# Patient Record
Sex: FEMALE | URBAN - METROPOLITAN AREA
[De-identification: names, ages, dates, MRNs, and addresses within clinical notes are randomized per-mention and may not be internally consistent; named-entity substitution may affect disease eponyms.]

---

## 2023-04-04 ENCOUNTER — LAB REQUISITION (OUTPATIENT)
Dept: LAB | Facility: CLINIC | Age: 73
End: 2023-04-04
Payer: COMMERCIAL

## 2023-04-04 DIAGNOSIS — I10 ESSENTIAL (PRIMARY) HYPERTENSION: ICD-10-CM

## 2023-04-05 LAB
ANION GAP SERPL CALCULATED.3IONS-SCNC: 12 MMOL/L (ref 7–15)
BUN SERPL-MCNC: 30.5 MG/DL (ref 8–23)
CALCIUM SERPL-MCNC: 9.3 MG/DL (ref 8.8–10.2)
CHLORIDE SERPL-SCNC: 107 MMOL/L (ref 98–107)
CREAT SERPL-MCNC: 0.59 MG/DL (ref 0.51–0.95)
DEPRECATED HCO3 PLAS-SCNC: 23 MMOL/L (ref 22–29)
GFR SERPL CREATININE-BSD FRML MDRD: >90 ML/MIN/1.73M2
GLUCOSE SERPL-MCNC: 75 MG/DL (ref 70–99)
POTASSIUM SERPL-SCNC: 4.3 MMOL/L (ref 3.4–5.3)
SODIUM SERPL-SCNC: 142 MMOL/L (ref 136–145)

## 2023-04-05 PROCEDURE — 80048 BASIC METABOLIC PNL TOTAL CA: CPT | Mod: ORL | Performed by: NURSE PRACTITIONER

## 2023-04-05 PROCEDURE — P9604 ONE-WAY ALLOW PRORATED TRIP: HCPCS | Mod: ORL | Performed by: NURSE PRACTITIONER

## 2023-04-05 PROCEDURE — 36415 COLL VENOUS BLD VENIPUNCTURE: CPT | Mod: ORL | Performed by: NURSE PRACTITIONER

## 2023-05-02 ENCOUNTER — LAB REQUISITION (OUTPATIENT)
Dept: LAB | Facility: CLINIC | Age: 73
End: 2023-05-02
Payer: COMMERCIAL

## 2023-05-02 DIAGNOSIS — I10 ESSENTIAL (PRIMARY) HYPERTENSION: ICD-10-CM

## 2023-05-10 LAB
ANION GAP SERPL CALCULATED.3IONS-SCNC: 12 MMOL/L (ref 7–15)
BUN SERPL-MCNC: 28.7 MG/DL (ref 8–23)
CALCIUM SERPL-MCNC: 9.6 MG/DL (ref 8.8–10.2)
CHLORIDE SERPL-SCNC: 102 MMOL/L (ref 98–107)
CREAT SERPL-MCNC: 0.68 MG/DL (ref 0.51–0.95)
DEPRECATED HCO3 PLAS-SCNC: 30 MMOL/L (ref 22–29)
GFR SERPL CREATININE-BSD FRML MDRD: >90 ML/MIN/1.73M2
GLUCOSE SERPL-MCNC: 83 MG/DL (ref 70–99)
POTASSIUM SERPL-SCNC: 3.1 MMOL/L (ref 3.4–5.3)
SODIUM SERPL-SCNC: 144 MMOL/L (ref 136–145)

## 2023-05-10 PROCEDURE — 36415 COLL VENOUS BLD VENIPUNCTURE: CPT | Mod: ORL | Performed by: NURSE PRACTITIONER

## 2023-05-10 PROCEDURE — P9604 ONE-WAY ALLOW PRORATED TRIP: HCPCS | Mod: ORL | Performed by: NURSE PRACTITIONER

## 2023-05-10 PROCEDURE — 80048 BASIC METABOLIC PNL TOTAL CA: CPT | Mod: ORL | Performed by: NURSE PRACTITIONER

## 2023-05-26 ENCOUNTER — MEDICAL CORRESPONDENCE (OUTPATIENT)
Dept: HEALTH INFORMATION MANAGEMENT | Facility: CLINIC | Age: 73
End: 2023-05-26
Payer: COMMERCIAL

## 2023-06-13 ENCOUNTER — LAB REQUISITION (OUTPATIENT)
Dept: LAB | Facility: CLINIC | Age: 73
End: 2023-06-13
Payer: COMMERCIAL

## 2023-06-13 DIAGNOSIS — I10 ESSENTIAL (PRIMARY) HYPERTENSION: ICD-10-CM

## 2023-06-14 ENCOUNTER — TRANSCRIBE ORDERS (OUTPATIENT)
Dept: OTHER | Age: 73
End: 2023-06-14

## 2023-06-14 DIAGNOSIS — H40.003 GLAUCOMA SUSPECT OF BOTH EYES: ICD-10-CM

## 2023-06-14 DIAGNOSIS — H52.4 PRESBYOPIA: ICD-10-CM

## 2023-06-14 DIAGNOSIS — H04.123 DRY EYE SYNDROME OF BILATERAL LACRIMAL GLANDS: ICD-10-CM

## 2023-06-14 DIAGNOSIS — H25.9 AGE-RELATED CATARACT OF BOTH EYES: Primary | ICD-10-CM

## 2023-06-14 LAB
ANION GAP SERPL CALCULATED.3IONS-SCNC: 14 MMOL/L (ref 7–15)
BUN SERPL-MCNC: 28.9 MG/DL (ref 8–23)
CALCIUM SERPL-MCNC: 8.7 MG/DL (ref 8.8–10.2)
CHLORIDE SERPL-SCNC: 104 MMOL/L (ref 98–107)
CREAT SERPL-MCNC: 0.5 MG/DL (ref 0.51–0.95)
DEPRECATED HCO3 PLAS-SCNC: 21 MMOL/L (ref 22–29)
GFR SERPL CREATININE-BSD FRML MDRD: >90 ML/MIN/1.73M2
GLUCOSE SERPL-MCNC: 128 MG/DL (ref 70–99)
POTASSIUM SERPL-SCNC: 3.8 MMOL/L (ref 3.4–5.3)
SODIUM SERPL-SCNC: 139 MMOL/L (ref 136–145)

## 2023-06-14 PROCEDURE — P9604 ONE-WAY ALLOW PRORATED TRIP: HCPCS | Mod: ORL | Performed by: NURSE PRACTITIONER

## 2023-06-14 PROCEDURE — 80048 BASIC METABOLIC PNL TOTAL CA: CPT | Mod: ORL | Performed by: NURSE PRACTITIONER

## 2023-06-14 PROCEDURE — 36415 COLL VENOUS BLD VENIPUNCTURE: CPT | Mod: ORL | Performed by: NURSE PRACTITIONER

## 2023-07-20 DIAGNOSIS — H40.9 GLAUCOMA: Primary | ICD-10-CM

## 2023-07-26 NOTE — PROGRESS NOTES
Chief Complaint/Presenting Concern: New glaucoma    History of Present Illness:   Telly Terrell is a 72 year old patient who presents for evaluation of glaucoma.     23: Patient states she never knew she had glaucoma previously. [place] Was not seen for a long period of time. Was seen by a doctor who came to her assisted living facility who told her she had glaucoma. She does not know the name of the doctor who saw her. She does not like the drops because the drops sting and make her eyes red.    Of note, patient will be undergoing open heart surgery soon.    Relevant Past Medical/Family/Social History: Patient lives in an assisted living facility. Medical history of bipolar disorder, aortic stenosis, adjustment disorder, and night terrors. Sees a psychologist regularly.    Relevant Review of Systems:      Diagnosis: Primary open angle glaucoma   Year diagnosis: ?  Previous glaucoma surgery/laser: None  Maximum intraocular pressure: 18/22 per outside review  Currently Meds: Dorzolamide BID OU, latanoprost at bedtime OU  Family history: positive grandmother  CCT: 514/514  Gonio: open, pigmented TM  Refractive status: Mild myopia  Trauma history: positive no direct trauma to the eyes, has fallen several times and hit her head  Steroid exposure: negative  Vasospastic disease: Migrane/Raynaud phenomenon: positive history of migraines  A past hemodynamic crisis or Low BP:: negative  Meds AEs/intolerance:  PMHx: Aortic stenosis  Anticoagulants: None     Today's testin/10 with applanation  Visual field 2023:   Right eye - dense constriction throughout entire field, reliable; baseline  Left eye - superior and inferior arcuate defects, reliable, baseline   OCT Optic Nerve RNFL Spectralis 2023  right eye: inferior thinning, baseline  left eye: severe thinning throughout, baseline    Additional Ocular History:  None    Plan/Recommendations:  Discussed findings with patient. VF and OCT RNFL consistent with glaucomatous changes, left eye >>> right eye. Asymmetry could be from questionable history of trauma. IOP elevated at 18/22 mmHg with outside provider. IOP in good range today. Mild to moderate cataracts both eyes with dry eye with possible shifting/dislocation of the lens nasally in the left eye.  Continue current medications:  Dorzolamide BID both eyes  Latanoprost at bedtime both eyes  Start PFATs 6 times daily both eyes   Recommend cornea eval for dry eye and possible lens zonulopathy in the left eye  Patient will obtain records from previous eye care provider    RTC 3-4 months VA, IOP, VF, OCT RNFL    Physician Attestation     Attending Physician Attestation:  Complete documentation of historical and exam elements from today's encounter can be found in the full encounter summary report (not reduplicated in this progress note). I personally obtained the chief complaint(s) and history of present illness. I confirmed and edited as necessary the review of systems, past medical/surgical history, family history, social history, and examination findings as documented by others; and I examined the patient myself. I personally reviewed the relevant tests, images, and reports as documented above. I personally reviewed the ophthalmic test(s) associated with this encounter, agree with the interpretation(s) as documented by the resident/fellow and have edited the corresponding report(s) as necessary. I formulated and edited as necessary the assessment and plan and discussed the findings and management plan with the patient and any family members present at the time of the visit.  Suly Lan M.D., Glaucoma, July 27, 2023

## 2023-07-27 ENCOUNTER — OFFICE VISIT (OUTPATIENT)
Dept: OPHTHALMOLOGY | Facility: CLINIC | Age: 73
End: 2023-07-27
Attending: OPTOMETRIST
Payer: COMMERCIAL

## 2023-07-27 DIAGNOSIS — H25.9 AGE-RELATED CATARACT OF BOTH EYES: ICD-10-CM

## 2023-07-27 DIAGNOSIS — H04.123 DRY EYE SYNDROME OF BILATERAL LACRIMAL GLANDS: ICD-10-CM

## 2023-07-27 DIAGNOSIS — H40.1123 PRIMARY OPEN ANGLE GLAUCOMA (POAG) OF LEFT EYE, SEVERE STAGE: ICD-10-CM

## 2023-07-27 DIAGNOSIS — H52.4 PRESBYOPIA: ICD-10-CM

## 2023-07-27 PROCEDURE — 76514 ECHO EXAM OF EYE THICKNESS: CPT | Performed by: OPHTHALMOLOGY

## 2023-07-27 PROCEDURE — 92004 COMPRE OPH EXAM NEW PT 1/>: CPT | Mod: GC | Performed by: OPHTHALMOLOGY

## 2023-07-27 PROCEDURE — 92083 EXTENDED VISUAL FIELD XM: CPT | Performed by: OPHTHALMOLOGY

## 2023-07-27 PROCEDURE — G0463 HOSPITAL OUTPT CLINIC VISIT: HCPCS | Performed by: OPHTHALMOLOGY

## 2023-07-27 PROCEDURE — 92133 CPTRZD OPH DX IMG PST SGM ON: CPT | Performed by: OPHTHALMOLOGY

## 2023-07-27 RX ORDER — CLONAZEPAM 1 MG/1
TABLET ORAL
COMMUNITY
Start: 2023-03-29

## 2023-07-27 RX ORDER — DULOXETIN HYDROCHLORIDE 30 MG/1
CAPSULE, DELAYED RELEASE ORAL
COMMUNITY
Start: 2023-01-05

## 2023-07-27 RX ORDER — FUROSEMIDE 20 MG
TABLET ORAL
COMMUNITY
Start: 2023-06-05

## 2023-07-27 RX ORDER — LATANOPROST 50 UG/ML
SOLUTION/ DROPS OPHTHALMIC
COMMUNITY
Start: 2023-05-30

## 2023-07-27 RX ORDER — POLYVINYL ALCOHOL 14 MG/ML
SOLUTION/ DROPS OPHTHALMIC
COMMUNITY
Start: 2023-05-30

## 2023-07-27 RX ORDER — DEXTROAMPHETAMINE SACCHARATE, AMPHETAMINE ASPARTATE, DEXTROAMPHETAMINE SULFATE AND AMPHETAMINE SULFATE 2.5; 2.5; 2.5; 2.5 MG/1; MG/1; MG/1; MG/1
TABLET ORAL
COMMUNITY
Start: 2023-06-20

## 2023-07-27 RX ORDER — QUETIAPINE FUMARATE 50 MG/1
TABLET, FILM COATED ORAL
COMMUNITY
Start: 2023-01-05

## 2023-07-27 RX ORDER — HYDROCODONE BITARTRATE AND ACETAMINOPHEN 5; 325 MG/1; MG/1
TABLET ORAL
COMMUNITY
Start: 2023-03-29

## 2023-07-27 RX ORDER — NAPROXEN 250 MG/1
TABLET ORAL
COMMUNITY
Start: 2023-06-05

## 2023-07-27 RX ORDER — POTASSIUM CHLORIDE 750 MG/1
TABLET, EXTENDED RELEASE ORAL
COMMUNITY
Start: 2022-02-24

## 2023-07-27 RX ORDER — DORZOLAMIDE HCL 20 MG/ML
SOLUTION/ DROPS OPHTHALMIC
COMMUNITY
Start: 2023-05-30

## 2023-07-27 RX ORDER — FERROUS SULFATE 325(65) MG
TABLET ORAL
COMMUNITY
Start: 2023-06-15

## 2023-07-27 RX ORDER — IBUPROFEN 800 MG/1
800 TABLET, FILM COATED ORAL
COMMUNITY
Start: 2022-10-04

## 2023-07-27 RX ORDER — PREGABALIN 150 MG/1
1 CAPSULE ORAL AT BEDTIME
COMMUNITY
Start: 2023-03-29

## 2023-07-27 RX ORDER — MENTHOL 40 MG/ML
GEL TOPICAL
COMMUNITY

## 2023-07-27 ASSESSMENT — REFRACTION_WEARINGRX
OD_SPHERE: -1.25
OD_ADD: +2.50
OS_AXIS: 160
OS_ADD: +2.50
OD_AXIS: 180
OS_SPHERE: -2.00
OS_CYLINDER: +1.00
OD_CYLINDER: +1.75

## 2023-07-27 ASSESSMENT — VISUAL ACUITY
OD_CC+: -2
OD_CC: 20/30
OS_CC+: -
OS_CC: 20/40
METHOD: SNELLEN - LINEAR

## 2023-07-27 ASSESSMENT — TONOMETRY
IOP_METHOD: APPLANATION
OS_IOP_MMHG: 10
IOP_METHOD: TONOPEN
OS_IOP_MMHG: 07
OD_IOP_MMHG: 07
OD_IOP_MMHG: 11

## 2023-07-27 ASSESSMENT — CUP TO DISC RATIO
OS_RATIO: 0.9
OD_RATIO: 0.7

## 2023-07-27 ASSESSMENT — CONF VISUAL FIELD
OD_SUPERIOR_TEMPORAL_RESTRICTION: 0
OD_NORMAL: 1
OD_SUPERIOR_NASAL_RESTRICTION: 0
OD_INFERIOR_TEMPORAL_RESTRICTION: 0
METHOD: COUNTING FINGERS
OD_INFERIOR_NASAL_RESTRICTION: 0
OS_INFERIOR_TEMPORAL_RESTRICTION: 3
OS_SUPERIOR_NASAL_RESTRICTION: 3

## 2023-07-27 ASSESSMENT — PACHYMETRY
OS_CT(UM): 514
EXAM_DATE: 7/27/2023
OD_CT(UM): 514

## 2023-07-27 ASSESSMENT — EXTERNAL EXAM - RIGHT EYE: OD_EXAM: NORMAL

## 2023-07-27 ASSESSMENT — SLIT LAMP EXAM - LIDS
COMMENTS: NORMAL
COMMENTS: NORMAL

## 2023-07-27 ASSESSMENT — EXTERNAL EXAM - LEFT EYE: OS_EXAM: NORMAL

## 2023-07-27 NOTE — NURSING NOTE
Chief Complaints and History of Present Illnesses   Patient presents with    Consult For     Glaucoma referred by Dr Kathleen Swartz OD     Chief Complaint(s) and History of Present Illness(es)       Consult For              Laterality: both eyes    Onset: 4 months ago    Course: gradually worsening    Associated symptoms: redness (associated with drop use) and headache (long history of migraine headaches).  Negative for eye pain and tearing    Treatments tried: eye drops    Pain scale: 0/10    Comments: Glaucoma referred by Dr Kathleen Swartz OD              Comments    She states that she had an eye exam tow months ago and has glasses on order.  Patient states that she has decreased vision in her left eye, which has been ongoing for the past 4 months.      She is using:  Dorzolamide BID each eye   Latanopost at bedtime each eye     AUGIE Aguayo 10:29 AM  July 27, 2023

## 2023-07-27 NOTE — PATIENT INSTRUCTIONS
Continue Dorzolamide 2 times daily both eyes  Latanoprost at bedtime both eyes  Start PFATs 4 times daily both eyes  Please separate each drop by 5 minutes    Please obtain records from previous eye care provider to share with our office

## 2023-07-28 ENCOUNTER — TELEPHONE (OUTPATIENT)
Dept: OPHTHALMOLOGY | Facility: CLINIC | Age: 73
End: 2023-07-28
Payer: COMMERCIAL

## 2023-07-28 NOTE — TELEPHONE ENCOUNTER
Spoke to pharmacy     Reviewed ok to discontinue previous Rx for artificial tears and use new Rx for Preservative Free artificial tears-- tears naturale or equivalent.    Pharmacy verbally demonstrated understanding    Michel Hall RN 2:42 PM 07/28/23            [facility] Call Center    Phone Message    May a detailed message be left on voicemail: no     Reason for Call: Medication Question or concern regarding medication   Prescription Clarification  Name of Medication: dextran 70-hypromellose (TEARS NATURALE FREE PF) 0.1-0.3 % ophthalmic   Prescribing Provider: Dr. Lan   Pharmacy: [facility]    What on the order needs clarification? Pharmacy is wondering if this medication is replacing the Artificial tears 1.4% solution or if this is in addition to that medication. Please clarify with Pharmacy. Thank you.       Action Taken: Message routed to:  [facility] EYE    Travel Screening: Not Applicable

## 2023-08-09 ENCOUNTER — OFFICE VISIT (OUTPATIENT)
Dept: OPHTHALMOLOGY | Facility: CLINIC | Age: 73
End: 2023-08-09
Attending: OPHTHALMOLOGY
Payer: COMMERCIAL

## 2023-08-09 DIAGNOSIS — H04.123 DRY EYE SYNDROME OF BILATERAL LACRIMAL GLANDS: ICD-10-CM

## 2023-08-09 DIAGNOSIS — H25.13 AGE-RELATED NUCLEAR CATARACT OF BOTH EYES: Primary | ICD-10-CM

## 2023-08-09 DIAGNOSIS — H40.1123 PRIMARY OPEN ANGLE GLAUCOMA (POAG) OF LEFT EYE, SEVERE STAGE: ICD-10-CM

## 2023-08-09 PROCEDURE — 92025 CPTRIZED CORNEAL TOPOGRAPHY: CPT | Performed by: OPHTHALMOLOGY

## 2023-08-09 PROCEDURE — 99213 OFFICE O/P EST LOW 20 MIN: CPT | Mod: GC | Performed by: OPHTHALMOLOGY

## 2023-08-09 PROCEDURE — G0463 HOSPITAL OUTPT CLINIC VISIT: HCPCS | Performed by: OPHTHALMOLOGY

## 2023-08-09 PROCEDURE — 76519 ECHO EXAM OF EYE: CPT | Performed by: OPHTHALMOLOGY

## 2023-08-09 ASSESSMENT — VISUAL ACUITY
OS_CC: 20/40
METHOD: SNELLEN - LINEAR
OD_CC: 20/30
OD_CC+: -2
OS_CC+: -2
CORRECTION_TYPE: GLASSES

## 2023-08-09 ASSESSMENT — CONF VISUAL FIELD
OD_INFERIOR_NASAL_RESTRICTION: 0
OS_INFERIOR_TEMPORAL_RESTRICTION: 3
OD_SUPERIOR_TEMPORAL_RESTRICTION: 0
OD_NORMAL: 1
METHOD: COUNTING FINGERS
OD_SUPERIOR_NASAL_RESTRICTION: 0
OD_INFERIOR_TEMPORAL_RESTRICTION: 0

## 2023-08-09 ASSESSMENT — REFRACTION_WEARINGRX
OD_ADD: +2.50
OS_SPHERE: -2.00
OD_AXIS: 180
OS_CYLINDER: +1.00
OD_SPHERE: -1.25
OD_CYLINDER: +1.75
OS_AXIS: 160
OS_ADD: +2.50

## 2023-08-09 ASSESSMENT — EXTERNAL EXAM - RIGHT EYE: OD_EXAM: NORMAL

## 2023-08-09 ASSESSMENT — SLIT LAMP EXAM - LIDS
COMMENTS: NORMAL
COMMENTS: NORMAL

## 2023-08-09 ASSESSMENT — EXTERNAL EXAM - LEFT EYE: OS_EXAM: NORMAL

## 2023-08-09 ASSESSMENT — TONOMETRY
IOP_METHOD: TONOPEN
OD_IOP_MMHG: 10
OS_IOP_MMHG: 11

## 2023-08-09 NOTE — PROGRESS NOTES
Chief complaint   Cataract eval      Referring Provider: Dr. Lan    HPI    Telly Xander 72 year old female with hx of POAG and dry eyes  Pt. C/o worsening VA BE over the last year. No pain BE. Does have dryness BE. No flashes or floaters BE.       Past ocular history   Prior eye surgery/laser/Trauma: none  CTL wearer:no    PMH     Past Medical History:   Diagnosis Date    Arthritis     Fibromyalgia     Lymphedema     Migraine     Narcolepsy     Restless legs syndrome        PSH   No past surgical history on file.    Meds     Current Outpatient Medications   Medication    amphetamine-dextroamphetamine (ADDERALL) 10 MG tablet    clonazePAM (KLONOPIN) 1 MG tablet    dextran 70-hypromellose (TEARS NATURALE FREE PF) 0.1-0.3 % ophthalmic solution    dorzolamide (TRUSOPT) 2 % ophthalmic solution    DULoxetine (CYMBALTA) 30 MG capsule    ferrous sulfate (FEROSUL) 325 (65 Fe) MG tablet    furosemide (LASIX) 20 MG tablet    HYDROcodone-acetaminophen (NORCO) 5-325 MG tablet    ibuprofen (ADVIL/MOTRIN) 800 MG tablet    latanoprost (XALATAN) 0.005 % ophthalmic solution    Menthol, Topical Analgesic, (BIOFREEZE ROLL-ON) 4 % GEL    naproxen (NAPROSYN) 250 MG tablet    polyvinyl alcohol (LIQUIFILM TEARS) 1.4 % ophthalmic solution    potassium chloride ER (K-TAB/KLOR-CON) 10 MEQ CR tablet    pregabalin (LYRICA) 150 MG capsule    QUEtiapine (SEROQUEL) 50 MG tablet     No current facility-administered medications for this visit.       Labs   -    Imaging   -    Drops Currently Taking   Dorzolamide BID ou  Latanoprost at bedtime ou  PFAT's, 6x/day, ou    Assessment/Plan   # Dry eyes, both eyes    - Mild, Continue the same treatment    # Senile Cataracts OU  Mild not affecting vision. Will not proceed with surgery for now   New prescription given today.    # POAG  Previous normal scans after trauma last year.  Poor vision potential left eye.  Dilated pupil     -Follows with Dr. Lan  -IOP wnl  -Drops per glaucoma   -Dorzolamide  BID ou  -Latanoprost at bedtime ou  -leave 5 min between eye drops  -AT at least 4 times a day    ALSO SEES PROVIDERS:  Dr. Lan in 2 months    Michel Doss MD  PGY-3 Ophthalmology    Attending Physician Attestation:  Complete documentation of historical and exam elements from today's encounter can be found in the full encounter summary report (not reduplicated in this progress note).  I personally obtained the chief complaint(s) and history of present illness.  I confirmed and edited as necessary the review of systems, past medical/surgical history, family history, social history, and examination findings as documented by others; and I examined the patient myself.  I personally reviewed the relevant tests, images, and reports as documented above.  I formulated and edited as necessary the assessment and plan and discussed the findings and management plan with the patient and family. - Melanie Patino MD

## 2023-08-09 NOTE — NURSING NOTE
Chief Complaints and History of Present Illnesses   Patient presents with    Cataract     Chief Complaint(s) and History of Present Illness(es)       Cataract              Laterality: both eyes    Duration: 1 year              Comments    Pt. C/o worsening VA  BE over the last year. No pain BE. Does have dryness BE. No flashes or floaters BE.   Mirna Ceballos COT 1:36 PM August 9, 2023

## 2023-08-14 DIAGNOSIS — H04.123 DRY EYE SYNDROME OF BILATERAL LACRIMAL GLANDS: ICD-10-CM

## 2023-08-24 ENCOUNTER — TELEPHONE (OUTPATIENT)
Dept: OPHTHALMOLOGY | Facility: CLINIC | Age: 73
End: 2023-08-24
Payer: COMMERCIAL

## 2023-11-21 ENCOUNTER — LAB REQUISITION (OUTPATIENT)
Dept: LAB | Facility: CLINIC | Age: 73
End: 2023-11-21
Payer: COMMERCIAL

## 2023-11-21 DIAGNOSIS — I35.0 NONRHEUMATIC AORTIC (VALVE) STENOSIS: ICD-10-CM

## 2023-11-22 LAB
ALBUMIN SERPL BCG-MCNC: 3.9 G/DL (ref 3.5–5.2)
ALP SERPL-CCNC: 77 U/L (ref 40–150)
ALT SERPL W P-5'-P-CCNC: 16 U/L (ref 0–50)
ANION GAP SERPL CALCULATED.3IONS-SCNC: 11 MMOL/L (ref 7–15)
AST SERPL W P-5'-P-CCNC: 23 U/L (ref 0–45)
BILIRUB DIRECT SERPL-MCNC: <0.2 MG/DL (ref 0–0.3)
BILIRUB SERPL-MCNC: 0.2 MG/DL
BUN SERPL-MCNC: 21.9 MG/DL (ref 8–23)
CALCIUM SERPL-MCNC: 9.4 MG/DL (ref 8.8–10.2)
CHLORIDE SERPL-SCNC: 106 MMOL/L (ref 98–107)
CREAT SERPL-MCNC: 0.57 MG/DL (ref 0.51–0.95)
DEPRECATED HCO3 PLAS-SCNC: 27 MMOL/L (ref 22–29)
EGFRCR SERPLBLD CKD-EPI 2021: >90 ML/MIN/1.73M2
ERYTHROCYTE [DISTWIDTH] IN BLOOD BY AUTOMATED COUNT: 13.3 % (ref 10–15)
GLUCOSE SERPL-MCNC: 115 MG/DL (ref 70–99)
HCT VFR BLD AUTO: 41.7 % (ref 35–47)
HGB BLD-MCNC: 12.9 G/DL (ref 11.7–15.7)
MCH RBC QN AUTO: 33.1 PG (ref 26.5–33)
MCHC RBC AUTO-ENTMCNC: 30.9 G/DL (ref 31.5–36.5)
MCV RBC AUTO: 107 FL (ref 78–100)
PLATELET # BLD AUTO: 204 10E3/UL (ref 150–450)
POTASSIUM SERPL-SCNC: 4.3 MMOL/L (ref 3.4–5.3)
PROT SERPL-MCNC: 6.3 G/DL (ref 6.4–8.3)
RBC # BLD AUTO: 3.9 10E6/UL (ref 3.8–5.2)
SODIUM SERPL-SCNC: 144 MMOL/L (ref 135–145)
WBC # BLD AUTO: 4.4 10E3/UL (ref 4–11)

## 2023-11-22 PROCEDURE — 80053 COMPREHEN METABOLIC PANEL: CPT | Mod: ORL | Performed by: NURSE PRACTITIONER

## 2023-11-22 PROCEDURE — 82248 BILIRUBIN DIRECT: CPT | Mod: ORL | Performed by: NURSE PRACTITIONER

## 2023-11-22 PROCEDURE — 36415 COLL VENOUS BLD VENIPUNCTURE: CPT | Mod: ORL | Performed by: NURSE PRACTITIONER

## 2023-11-22 PROCEDURE — 85027 COMPLETE CBC AUTOMATED: CPT | Mod: ORL | Performed by: NURSE PRACTITIONER

## 2023-11-22 PROCEDURE — P9604 ONE-WAY ALLOW PRORATED TRIP: HCPCS | Mod: ORL | Performed by: NURSE PRACTITIONER

## 2024-01-30 ENCOUNTER — LAB REQUISITION (OUTPATIENT)
Dept: LAB | Facility: CLINIC | Age: 74
End: 2024-01-30
Payer: COMMERCIAL

## 2024-01-30 DIAGNOSIS — D47.2 MONOCLONAL GAMMOPATHY: ICD-10-CM

## 2024-02-06 ENCOUNTER — LAB REQUISITION (OUTPATIENT)
Dept: LAB | Facility: CLINIC | Age: 74
End: 2024-02-06
Payer: COMMERCIAL

## 2024-02-06 DIAGNOSIS — D64.9 ANEMIA, UNSPECIFIED: Primary | ICD-10-CM

## 2024-02-06 DIAGNOSIS — D64.9 ANEMIA, UNSPECIFIED: ICD-10-CM

## 2024-02-07 LAB
ANION GAP SERPL CALCULATED.3IONS-SCNC: 12 MMOL/L (ref 7–15)
BUN SERPL-MCNC: 21.2 MG/DL (ref 8–23)
CALCIUM SERPL-MCNC: 9.6 MG/DL (ref 8.8–10.2)
CHLORIDE SERPL-SCNC: 104 MMOL/L (ref 98–107)
CREAT SERPL-MCNC: 0.56 MG/DL (ref 0.51–0.95)
DEPRECATED HCO3 PLAS-SCNC: 24 MMOL/L (ref 22–29)
EGFRCR SERPLBLD CKD-EPI 2021: >90 ML/MIN/1.73M2
ERYTHROCYTE [DISTWIDTH] IN BLOOD BY AUTOMATED COUNT: 15.6 % (ref 10–15)
FERRITIN SERPL-MCNC: 116 NG/ML (ref 11–328)
GLUCOSE SERPL-MCNC: 101 MG/DL (ref 70–99)
HCT VFR BLD AUTO: 41.4 % (ref 35–47)
HGB BLD-MCNC: 12.7 G/DL (ref 11.7–15.7)
IRON BINDING CAPACITY (ROCHE): 303 UG/DL (ref 240–430)
IRON SATN MFR SERPL: 22 % (ref 15–46)
IRON SERPL-MCNC: 68 UG/DL (ref 37–145)
MCH RBC QN AUTO: 31.8 PG (ref 26.5–33)
MCHC RBC AUTO-ENTMCNC: 30.7 G/DL (ref 31.5–36.5)
MCV RBC AUTO: 104 FL (ref 78–100)
PLATELET # BLD AUTO: 232 10E3/UL (ref 150–450)
POTASSIUM SERPL-SCNC: 4.5 MMOL/L (ref 3.4–5.3)
RBC # BLD AUTO: 3.99 10E6/UL (ref 3.8–5.2)
SODIUM SERPL-SCNC: 140 MMOL/L (ref 135–145)
VIT B12 SERPL-MCNC: 665 PG/ML (ref 232–1245)
WBC # BLD AUTO: 5.9 10E3/UL (ref 4–11)

## 2024-02-07 PROCEDURE — 85027 COMPLETE CBC AUTOMATED: CPT | Mod: ORL | Performed by: NURSE PRACTITIONER

## 2024-02-07 PROCEDURE — 82728 ASSAY OF FERRITIN: CPT | Mod: ORL | Performed by: NURSE PRACTITIONER

## 2024-02-07 PROCEDURE — 36415 COLL VENOUS BLD VENIPUNCTURE: CPT | Mod: ORL | Performed by: NURSE PRACTITIONER

## 2024-02-07 PROCEDURE — P9604 ONE-WAY ALLOW PRORATED TRIP: HCPCS | Mod: ORL | Performed by: NURSE PRACTITIONER

## 2024-02-07 PROCEDURE — 80048 BASIC METABOLIC PNL TOTAL CA: CPT | Mod: ORL | Performed by: NURSE PRACTITIONER

## 2024-02-07 PROCEDURE — 82607 VITAMIN B-12: CPT | Mod: ORL | Performed by: NURSE PRACTITIONER

## 2024-02-07 PROCEDURE — 83550 IRON BINDING TEST: CPT | Mod: ORL | Performed by: NURSE PRACTITIONER

## 2024-05-14 ENCOUNTER — TELEPHONE (OUTPATIENT)
Dept: OPHTHALMOLOGY | Facility: CLINIC | Age: 74
End: 2024-05-14
Payer: COMMERCIAL

## 2024-05-14 NOTE — TELEPHONE ENCOUNTER
[facility] Call Center    Phone Message    May a detailed message be left on voicemail: no     Reason for Call: Symptoms or Concerns     If patient has red-flag symptoms, warm transfer to triage line    Current symptom or concern: per pt she had a slip and fall appox 4 wks ago pt advised her left side of her face, pt is noticing some improvement, pt advises left eye blurry vision and there is a bump under her eye size of a ping pong ball and it's hard, pain, twitching started approx 2 days ago pt of Dr Riky Lindsey Triage nurse     Writer checked with pt per triage nurse and pt lives in assisted living so a HIGH TE will be sent to clinic per pt she is advised to let Dr Lan know, please contact pt to discuss options Please call [phone] to reach pt Thank you    Symptoms have been present for:  4 week    Has patient previously been seen for this? No    By : N/A    Date: N/A    Are there any new or worsening symptoms? Yes:     Action Taken: Message routed to:  [facility] eye    Travel Screening: Not Applicable

## 2024-05-14 NOTE — TELEPHONE ENCOUNTER
Caller reporting the following red-flag symptom(s): per pt she had a slip and fall appox 4 wks ago pt advised her left side of her face, pt is noticing some improvement, pt advises left eye blurry vision and there is a bump under her eye size of a ping pong ball and it's hard, pain, twitching started approx 2 days ago pt of Dr Lan    Per the system red-flag symptom policy, patient was instructed to:  speak with a Registered Nurse    Action:  Patient warm transferred to a Registered Nurse  Rosalia    Writer checked with pt per triage nurse and pt lives in assisted living so a HIGH TE will be sent to clinic per pt she is advised to let Dr Lan know

## 2024-05-15 NOTE — TELEPHONE ENCOUNTER
[phone]     Spoke to pt at 0843     Pt feel about 3-4 weeks ago and had injury to face.    Pt had x-rays on finger and face per patient.    Pt with bump under eye that is smaller and about the size of a marble now per pt.    No eye pain    Tenderness where bump is    Some more redness     Pt states left eye vision blurrier one week after fall. Constant and not worsening per pt    Scheduled in acute eye clinic tomorrow at 2 PM at [facility] location.    Pt aware of date/time/location at [facility] and has direct number if have any trouble arranging transportation.    Michel Hall RN 9:59 AM 05/15/24

## 2024-09-02 ENCOUNTER — LAB REQUISITION (OUTPATIENT)
Dept: LAB | Facility: CLINIC | Age: 74
End: 2024-09-02
Payer: COMMERCIAL

## 2024-09-02 DIAGNOSIS — E87.6 HYPOKALEMIA: ICD-10-CM

## 2024-09-02 DIAGNOSIS — I35.0 NONRHEUMATIC AORTIC (VALVE) STENOSIS: ICD-10-CM

## 2024-09-04 LAB
BASOPHILS # BLD AUTO: 0 10E3/UL (ref 0–0.2)
BASOPHILS NFR BLD AUTO: 1 %
EOSINOPHIL # BLD AUTO: 0.1 10E3/UL (ref 0–0.7)
EOSINOPHIL NFR BLD AUTO: 2 %
ERYTHROCYTE [DISTWIDTH] IN BLOOD BY AUTOMATED COUNT: 13.9 % (ref 10–15)
HCT VFR BLD AUTO: 42.6 % (ref 35–47)
HGB BLD-MCNC: 13.4 G/DL (ref 11.7–15.7)
IMM GRANULOCYTES # BLD: 0 10E3/UL
IMM GRANULOCYTES NFR BLD: 0 %
LYMPHOCYTES # BLD AUTO: 1.4 10E3/UL (ref 0.8–5.3)
LYMPHOCYTES NFR BLD AUTO: 31 %
MCH RBC QN AUTO: 33.1 PG (ref 26.5–33)
MCHC RBC AUTO-ENTMCNC: 31.5 G/DL (ref 31.5–36.5)
MCV RBC AUTO: 105 FL (ref 78–100)
MONOCYTES # BLD AUTO: 0.3 10E3/UL (ref 0–1.3)
MONOCYTES NFR BLD AUTO: 8 %
NEUTROPHILS # BLD AUTO: 2.6 10E3/UL (ref 1.6–8.3)
NEUTROPHILS NFR BLD AUTO: 58 %
NRBC # BLD AUTO: 0 10E3/UL
NRBC BLD AUTO-RTO: 0 /100
PLATELET # BLD AUTO: 245 10E3/UL (ref 150–450)
RBC # BLD AUTO: 4.05 10E6/UL (ref 3.8–5.2)
WBC # BLD AUTO: 4.4 10E3/UL (ref 4–11)

## 2024-09-04 PROCEDURE — 36415 COLL VENOUS BLD VENIPUNCTURE: CPT | Mod: ORL | Performed by: REGISTERED NURSE

## 2024-09-04 PROCEDURE — P9604 ONE-WAY ALLOW PRORATED TRIP: HCPCS | Mod: ORL | Performed by: REGISTERED NURSE

## 2024-09-04 PROCEDURE — 85025 COMPLETE CBC W/AUTO DIFF WBC: CPT | Mod: ORL | Performed by: REGISTERED NURSE

## 2024-09-04 PROCEDURE — 80053 COMPREHEN METABOLIC PANEL: CPT | Mod: ORL | Performed by: REGISTERED NURSE

## 2024-09-05 LAB
ALBUMIN SERPL BCG-MCNC: 4.3 G/DL (ref 3.5–5.2)
ALP SERPL-CCNC: 82 U/L (ref 40–150)
ALT SERPL W P-5'-P-CCNC: 17 U/L (ref 0–50)
ANION GAP SERPL CALCULATED.3IONS-SCNC: 11 MMOL/L (ref 7–15)
AST SERPL W P-5'-P-CCNC: 25 U/L (ref 0–45)
BILIRUB SERPL-MCNC: 0.4 MG/DL
BUN SERPL-MCNC: 13.6 MG/DL (ref 8–23)
CALCIUM SERPL-MCNC: 9.4 MG/DL (ref 8.8–10.4)
CHLORIDE SERPL-SCNC: 105 MMOL/L (ref 98–107)
CREAT SERPL-MCNC: 0.59 MG/DL (ref 0.51–0.95)
EGFRCR SERPLBLD CKD-EPI 2021: >90 ML/MIN/1.73M2
GLUCOSE SERPL-MCNC: 105 MG/DL (ref 70–99)
HCO3 SERPL-SCNC: 26 MMOL/L (ref 22–29)
POTASSIUM SERPL-SCNC: 3.8 MMOL/L (ref 3.4–5.3)
PROT SERPL-MCNC: 7.3 G/DL (ref 6.4–8.3)
SODIUM SERPL-SCNC: 142 MMOL/L (ref 135–145)

## 2024-09-06 ENCOUNTER — NURSE TRIAGE (OUTPATIENT)
Dept: OPHTHALMOLOGY | Facility: CLINIC | Age: 74
End: 2024-09-06
Payer: COMMERCIAL

## 2024-09-06 NOTE — TELEPHONE ENCOUNTER
Is at an assisted living facility. Had a fall end of April on Left side of face/eye area. Had bruising present. Had bump under eye with bruising, . Patient still has small bump under eye and it is tender to touch. Seems to have increased blurry vision in Left eye. Has small twitch to eye also Patient would like phone call to get eye evaluated.     Protocol reviewed, Disposition: Go to ED/[facility] now (or to office with PCP Approval) Routed to eye department    Shahla BROWN RN  [facility]/Red Flag Triage & Med Refill Team  Reason for Disposition   Blurred vision and new or worsening    Additional Information   Negative: Followed an eye injury   Negative: Eye pain from chemical in the eye   Negative: Eye pain from foreign body in eye   Negative: Has sinus pain or pressure   Negative: SEVERE eye pain   Negative: Complete loss of vision in one or both eyes   Negative: Eyelids are very swollen (shut or almost) and fever   Negative: Eyelid (outer) is very red and fever   Negative: Foreign body sensation ('feels like something is in there') and irrigation didn't help   Negative: Vomiting   Negative: Ulcer or sore seen on the cornea (clear center part of the eye)   Negative: Recent eye surgery and increasing eye pain    Protocols used: Eye Pain and Other Symptoms-A-[place]

## 2024-09-06 NOTE — TELEPHONE ENCOUNTER
Xander, Jan [phone]     Spoke to Telly at 1740    Eye symptoms/vision changes since fall in April.    Offered appt on Monday and pt unable--scheduled Friday with Dr. Victor at 1240 and pt aware of date/time/location at [facility]/duration.    Michel Hall RN 5:45 PM 09/06/24

## 2024-09-06 NOTE — TELEPHONE ENCOUNTER
Caller reporting the following red-flag symptom(s): eye pain and blurry vision worsening    Per the system red-flag symptom policy, patient was instructed to:  speak with a Registered Nurse    Action:  Patient warm transferred to a Registered Nurse

## 2025-04-20 ENCOUNTER — LAB REQUISITION (OUTPATIENT)
Dept: LAB | Facility: CLINIC | Age: 75
End: 2025-04-20
Payer: COMMERCIAL

## 2025-04-20 DIAGNOSIS — I10 ESSENTIAL (PRIMARY) HYPERTENSION: ICD-10-CM

## 2025-04-30 LAB
ANION GAP SERPL CALCULATED.3IONS-SCNC: 12 MMOL/L (ref 7–15)
BASOPHILS # BLD AUTO: 0 10E3/UL (ref 0–0.2)
BASOPHILS NFR BLD AUTO: 1 %
BUN SERPL-MCNC: 19.8 MG/DL (ref 8–23)
CALCIUM SERPL-MCNC: 9.6 MG/DL (ref 8.8–10.4)
CHLORIDE SERPL-SCNC: 110 MMOL/L (ref 98–107)
CREAT SERPL-MCNC: 0.67 MG/DL (ref 0.51–0.95)
EGFRCR SERPLBLD CKD-EPI 2021: >90 ML/MIN/1.73M2
EOSINOPHIL # BLD AUTO: 0.2 10E3/UL (ref 0–0.7)
EOSINOPHIL NFR BLD AUTO: 4 %
ERYTHROCYTE [DISTWIDTH] IN BLOOD BY AUTOMATED COUNT: 14.7 % (ref 10–15)
GLUCOSE SERPL-MCNC: 88 MG/DL (ref 70–99)
HCO3 SERPL-SCNC: 21 MMOL/L (ref 22–29)
HCT VFR BLD AUTO: 40.8 % (ref 35–47)
HGB BLD-MCNC: 12.1 G/DL (ref 11.7–15.7)
IMM GRANULOCYTES # BLD: 0 10E3/UL
IMM GRANULOCYTES NFR BLD: 0 %
LYMPHOCYTES # BLD AUTO: 1.9 10E3/UL (ref 0.8–5.3)
LYMPHOCYTES NFR BLD AUTO: 35 %
MCH RBC QN AUTO: 28.6 PG (ref 26.5–33)
MCHC RBC AUTO-ENTMCNC: 29.7 G/DL (ref 31.5–36.5)
MCV RBC AUTO: 97 FL (ref 78–100)
MONOCYTES # BLD AUTO: 0.3 10E3/UL (ref 0–1.3)
MONOCYTES NFR BLD AUTO: 6 %
NEUTROPHILS # BLD AUTO: 2.9 10E3/UL (ref 1.6–8.3)
NEUTROPHILS NFR BLD AUTO: 54 %
NRBC # BLD AUTO: 0 10E3/UL
NRBC BLD AUTO-RTO: 0 /100
PLATELET # BLD AUTO: 224 10E3/UL (ref 150–450)
POTASSIUM SERPL-SCNC: 3.8 MMOL/L (ref 3.4–5.3)
RBC # BLD AUTO: 4.23 10E6/UL (ref 3.8–5.2)
SODIUM SERPL-SCNC: 143 MMOL/L (ref 135–145)
WBC # BLD AUTO: 5.3 10E3/UL (ref 4–11)

## 2025-04-30 PROCEDURE — 36415 COLL VENOUS BLD VENIPUNCTURE: CPT | Mod: ORL

## 2025-04-30 PROCEDURE — 80048 BASIC METABOLIC PNL TOTAL CA: CPT | Mod: ORL

## 2025-04-30 PROCEDURE — P9604 ONE-WAY ALLOW PRORATED TRIP: HCPCS | Mod: ORL

## 2025-04-30 PROCEDURE — 85025 COMPLETE CBC W/AUTO DIFF WBC: CPT | Mod: ORL
